# Patient Record
Sex: FEMALE | Race: WHITE | Employment: FULL TIME | URBAN - METROPOLITAN AREA
[De-identification: names, ages, dates, MRNs, and addresses within clinical notes are randomized per-mention and may not be internally consistent; named-entity substitution may affect disease eponyms.]

---

## 2018-08-09 ENCOUNTER — HOSPITAL ENCOUNTER (EMERGENCY)
Facility: CLINIC | Age: 38
Discharge: HOME | End: 2018-08-09
Attending: EMERGENCY MEDICINE | Admitting: EMERGENCY MEDICINE
Payer: COMMERCIAL

## 2018-08-09 VITALS
HEART RATE: 111 BPM | SYSTOLIC BLOOD PRESSURE: 158 MMHG | RESPIRATION RATE: 18 BRPM | OXYGEN SATURATION: 97 % | DIASTOLIC BLOOD PRESSURE: 97 MMHG | TEMPERATURE: 98.4 F | HEIGHT: 65 IN | WEIGHT: 140 LBS | BODY MASS INDEX: 23.32 KG/M2

## 2018-08-09 DIAGNOSIS — R04.0 EPISTAXIS: ICD-10-CM

## 2018-08-09 DIAGNOSIS — F41.0 PANIC ATTACK: ICD-10-CM

## 2018-08-09 PROCEDURE — 99283 EMERGENCY DEPT VISIT LOW MDM: CPT

## 2018-08-09 PROCEDURE — 25000132 ZZH RX MED GY IP 250 OP 250 PS 637: Performed by: EMERGENCY MEDICINE

## 2018-08-09 RX ORDER — LORAZEPAM 2 MG/ML
2 INJECTION INTRAMUSCULAR ONCE
Status: DISCONTINUED | OUTPATIENT
Start: 2018-08-09 | End: 2018-08-09 | Stop reason: HOSPADM

## 2018-08-09 RX ORDER — LORAZEPAM 1 MG/1
1 TABLET ORAL ONCE
Status: COMPLETED | OUTPATIENT
Start: 2018-08-09 | End: 2018-08-09

## 2018-08-09 RX ADMIN — LORAZEPAM 1 MG: 1 TABLET ORAL at 13:53

## 2018-08-09 ASSESSMENT — ENCOUNTER SYMPTOMS
NERVOUS/ANXIOUS: 1
TREMORS: 1

## 2018-08-09 NOTE — ED PROVIDER NOTES
"  History     Chief Complaint:  Panic attack and nose bleed    HPI   Amy L Boeckermann is a 37 year old female with a history of anxiety who presents to the emergency department with her dad for evaluation of a panic attack and nose bleed. The patient reports that this morning she woke up with a \"gushing\" nose bleed. She applied pressure and states it stopped for a little bit but then returned with more severe bleeding. She can not remember the last time she had a nose bleed and the blood triggered a panic attack. She has a history of panic attacks but notes she has never had one this bad or last this long. She now complains of spasms in her back from the constant tremors. She takes 20 mg of Ativan every morning and throughout the day as needed. She took one most recently at 1100 which did not help calm her down or stop her shaking which prompted her to come to the ED. The patient admits to drinking alcohol last night. She has not taken any aspirin or Advil lately and takes Tylenol PM occasionally to help her sleep. She denies bleeding anywhere else.     Allergies:  No Known Drug Allergies     Medications:    Acyclovir  Escitalopram oxalate  Ativan  Atarax    Past Medical History:    Anxiety  psoriasis    Past Surgical History:    History reviewed. No pertinent past surgical history.    Family History:    History reviewed. No pertinent family history.    Social History:  Former smoker  Positive for alcohol use.   Marital Status:        Review of Systems   HENT: Positive for nosebleeds.    Neurological: Positive for tremors.   Psychiatric/Behavioral: The patient is nervous/anxious.    All other systems reviewed and are negative.    Physical Exam   First Vitals:  BP: (!) 153/102  Pulse: 111  Temp: 98.4  F (36.9  C)  Resp: 24  Height: 165.1 cm (5' 5\")  Weight: 63.5 kg (140 lb)  SpO2: 96 %    Patient Vitals for the past 24 hrs:   BP Temp Temp src Pulse Resp SpO2 Height Weight   08/09/18 1246 (!) 153/102 98.4  F " "(36.9  C) Oral 111 24 96 % 1.651 m (5' 5\") 63.5 kg (140 lb)       Physical Exam    Constitutional: White female, sitting with nose clamp in place. Tremulous and anxious.   HENT: No signs of trauma. Oropharynx clear.   Eyes: EOM are normal. Pupils are equal, round, and reactive to light.   Neck: Normal range of motion. No JVD present. No cervical adenopathy.  Cardiovascular: Regular rhythm.  Exam reveals no gallop and no friction rub.    No murmur heard.  Pulmonary/Chest: Bilateral breath sounds normal. No wheezes, rhonchi or rales.  Abdominal: Soft. No tenderness. No rebound or guarding.   Musculoskeletal: No edema. No tenderness.   Lymphadenopathy: No lymphadenopathy.   Neurological: Alert and oriented to person, place, and time. Normal strength. Coordination normal.   Skin: Skin is warm and dry. No rash noted. No erythema.   Psych: Extremely anxious.    Emergency Department Course   Interventions:  1353 Ativan 1 mg PO    Emergency Department Course:  1309 Nursing notes and vitals reviewed. I performed an exam of the patient as documented above.     Medicine administered as documented above.     1416 I rechecked the patient and discussed the results of her workup thus far.     Findings and plan explained to the Patient. Patient discharged home with instructions regarding supportive care, medications, and reasons to return. The importance of close follow-up was reviewed.     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 This turned into a panic attack. She did take an Ativan which did help a little bit but she felt very shaky and came in. On exam, she felt quite tremulous and has a nose clamp on. Patient was given some oral Ativan after another half an hour and I was able to look into her nose, there was no active bleeding in either nares anteriorly. There was no blood posteriorly that I could see. She blew her nose and no clot came out, there was a " very slight pink tinge. There was no blood in the oropharynx. Patient was watched for an additional 20 minutes without any more bleeding. I decided that given her severe anxiety,  I would not place packing at this time, but did instruct her in controlling nose bleeds and if she could not get a recurrent nose bleed to stop, to return to the ED. I did refer her to Dr. Santos also if she has frequent nose bleeds    Diagnosis:    ICD-10-CM    1. Panic attack F41.0    2. Epistaxis R04.0        Disposition:  discharged to home    Scribe Disclosure:  I, Juno Prince, am serving as a scribe on 8/9/2018 at 1:09 PM to personally document services performed by Dr. Mayi MD based on my observations and the provider's statements to me.     Juno Prince  8/9/2018    EMERGENCY DEPARTMENT       Michael See MD  08/09/18 8256

## 2018-08-09 NOTE — DISCHARGE INSTRUCTIONS
Anxiety Reaction  Anxiety is the feeling we all get when we think something bad might happen. It is a normal response to stress and usually causes only a mild reaction. When anxiety becomes more severe, it can interfere with daily life. In some cases, you may not even be aware of what it is you re anxious about. There may also be a genetic link or it may be a learned behavior in the home.  Both psychological and physical triggers cause stress reaction. It's often a response to fear or emotional stress, real or imagined. This stress may come from home, family, work, or social relationships.  During an anxiety reaction, you may feel:    Helpless    Nervous    Depressed    Irritable  Your body may show signs of anxiety in many ways. You may experience:    Dry mouth    Shakiness    Dizziness    Weakness    Trouble breathing    Breathing fast (hyperventilating)    Chest pressure    Sweating    Headache    Nausea    Diarrhea    Tiredness    Inability to sleep    Sexual problems  Home care    Try to locate the sources of stress in your life. They may not be obvious. These may include:  ? Daily hassles of life (such as traffic jams, missed appointments, or car troubles)  ? Major life changes, both good (new baby or job promotion) and bad (loss of job or loss of loved one)  ? Overload: feeling that you have too many responsibilities and can't take care of all of them at once  ? Feeling helpless or feeling that your problems are beyond what you re able to solve    Notice how your body reacts to stress. Learn to listen to your body signals. This will help you take action before the stress becomes severe.    When you can, do something about the source of your stress. (Avoid hassles, limit the amount of change that happens in your life at one time and take a break when you feel overloaded).    Unfortunately, many stressful situations can't be avoided. It is necessary to learn how to better manage stress. There are many proven  methods that will reduce your anxiety. These include simple things like exercise, good nutrition, and adequate rest. Also, there are certain techniques that are helpful:  ? Relaxation  ? Breathing exercises  ? Visualization  ? Biofeedback  ? Meditation  For more information about this, consult your healthcare provider or go to a local bookstore and review the many books and tapes available on this subject.  Follow-up care  If you feel that your anxiety is not responding to self-help measures, contact your healthcare provider or make an appointment with a counselor. You may need short-term psychological counseling and temporary medicine to help you manage stress.  Call 911  Call 911 if any of these happen:    Trouble breathing    Confusion    Drowsiness or trouble wakening    Fainting or loss of consciousness    Rapid heart rate    Seizure    New chest pain that becomes more severe, lasts longer, or spreads into your shoulder, arm, neck, jaw, or back  When to seek medical advice  Call your healthcare provider right away if any of these happen:    Your symptoms get worse    Severe headache not relieved by rest and mild pain reliever  Date Last Reviewed: 10/1/2017    3438-[address]84 [employer] [address]. [address] [place] [address], [place] [address]. All rights reserved. This information is not intended as a substitute for professional medical care. Always follow your healthcare professional's instructions.

## 2018-08-09 NOTE — ED AVS SNAPSHOT
Emergency Department    [address] [facility] [address]    Phone:  [phone]    Fax:  [phone]                                       Amy L Boeckermann   MRN: [id number]    Department:   Emergency Department   Date of Visit:  8/9/2018           Patient Information     Date Of Birth          1980        Your diagnoses for this visit were:     Panic attack     Epistaxis        You were seen by Michael See MD.      Follow-up Information     Schedule an appointment as soon as possible for a visit with Mahogany Santos MD.    Specialty:  Otolaryngology    Why:  As needed;  recheck ed if bleeding reoccurs and can't stop    Contact information:    [facility]  [address] [address]  [phone]          Discharge Instructions         Anxiety Reaction  Anxiety is the feeling we all get when we think something bad might happen. It is a normal response to stress and usually causes only a mild reaction. When anxiety becomes more severe, it can interfere with daily life. In some cases, you may not even be aware of what it is you re anxious about. There may also be a genetic link or it may be a learned behavior in the home.  Both psychological and physical triggers cause stress reaction. It's often a response to fear or emotional stress, real or imagined. This stress may come from home, family, work, or social relationships.  During an anxiety reaction, you may feel:    Helpless    Nervous    Depressed    Irritable  Your body may show signs of anxiety in many ways. You may experience:    Dry mouth    Shakiness    Dizziness    Weakness    Trouble breathing    Breathing fast (hyperventilating)    Chest pressure    Sweating    Headache    Nausea    Diarrhea    Tiredness    Inability to sleep    Sexual problems  Home care    Try to locate the sources of stress in your life. They may not be obvious. These may include:  ? Daily hassles of life (such as  traffic jams, missed appointments, or car troubles)  ? Major life changes, both good (new baby or job promotion) and bad (loss of job or loss of loved one)  ? Overload: feeling that you have too many responsibilities and can't take care of all of them at once  ? Feeling helpless or feeling that your problems are beyond what you re able to solve    Notice how your body reacts to stress. Learn to listen to your body signals. This will help you take action before the stress becomes severe.    When you can, do something about the source of your stress. (Avoid hassles, limit the amount of change that happens in your life at one time and take a break when you feel overloaded).    Unfortunately, many stressful situations can't be avoided. It is necessary to learn how to better manage stress. There are many proven methods that will reduce your anxiety. These include simple things like exercise, good nutrition, and adequate rest. Also, there are certain techniques that are helpful:  ? Relaxation  ? Breathing exercises  ? Visualization  ? Biofeedback  ? Meditation  For more information about this, consult your healthcare provider or go to a local bookstore and review the many books and tapes available on this subject.  Follow-up care  If you feel that your anxiety is not responding to self-help measures, contact your healthcare provider or make an appointment with a counselor. You may need short-term psychological counseling and temporary medicine to help you manage stress.  Call 911  Call 911 if any of these happen:    Trouble breathing    Confusion    Drowsiness or trouble wakening    Fainting or loss of consciousness    Rapid heart rate    Seizure    New chest pain that becomes more severe, lasts longer, or spreads into your shoulder, arm, neck, jaw, or back  When to seek medical advice  Call your healthcare provider right away if any of these happen:    Your symptoms get worse    Severe headache not relieved by rest and  mild pain reliever  Date Last Reviewed: 10/1/2017    8558-3906 [employer]. [address] [place] [address], [place] [address]. All rights reserved. This information is not intended as a substitute for professional medical care. Always follow your healthcare professional's instructions.          Discharge References/Attachments     NOSEBLEED (ENGLISH)      24 Hour Appointment Hotline       To make an appointment at any [facility], call [phone] ([phone]). If you don't have a family doctor or clinic, we will help you find one. [facility] are conveniently located to serve the needs of you and your family.             Review of your medicines      Our records show that you are taking the medicines listed below. If these are incorrect, please call your family doctor or clinic.        Dose / Directions Last dose taken    ACYCLOVIR PO        Refills:  0        ATIVAN PO        Refills:  0        ESCITALOPRAM OXALATE PO        Refills:  0        hydrOXYzine 25 MG tablet   Commonly known as:  ATARAX   Dose:  25-50 mg   Quantity:  60 tablet        Take 1-2 tablets (25-50 mg) by mouth every 6 hours as needed for itching   Refills:  0                Orders Needing Specimen Collection     None      Pending Results     No orders found from 8/7/2018 to 8/10/2018.            Pending Culture Results     No orders found from 8/7/2018 to 8/10/2018.            Pending Results Instructions     If you had any lab results that were not finalized at the time of your Discharge, you can call the ED Lab Result RN at [phone]. You will be contacted by this team for any positive Lab results or changes in treatment. The nurses are available 7 days a week from 10A to 6:30P.  You can leave a message 24 hours per day and they will return your call.        Test Results From Your Hospital Stay               Clinical Quality Measure: Blood Pressure Screening     Your blood pressure was checked while you were in the  "emergency department today. The last reading we obtained was  BP: (!) 158/97 . Please read the guidelines below about what these numbers mean and what you should do about them.  If your systolic blood pressure (the top number) is less than 120 and your diastolic blood pressure (the bottom number) is less than 80, then your blood pressure is normal. There is nothing more that you need to do about it.  If your systolic blood pressure (the top number) is 120-139 or your diastolic blood pressure (the bottom number) is 80-89, your blood pressure may be higher than it should be. You should have your blood pressure rechecked within a year by a primary care provider.  If your systolic blood pressure (the top number) is 140 or greater or your diastolic blood pressure (the bottom number) is 90 or greater, you may have high blood pressure. High blood pressure is treatable, but if left untreated over time it can put you at risk for heart attack, stroke, or kidney failure. You should have your blood pressure rechecked by a primary care provider within the next 4 weeks.  If your provider in the emergency department today gave you specific instructions to follow-up with your doctor or provider even sooner than that, you should follow that instruction and not wait for up to 4 weeks for your follow-up visit.        Thank you for choosing [facility]       Thank you for choosing [facility] for your care. Our goal is always to provide you with excellent care. Hearing back from our patients is one way we can continue to improve our services. Please take a few minutes to complete the written survey that you may receive in the mail after you visit with us. Thank you!        [facility] Information     [employer] lets you send messages to your doctor, view your test results, renew your prescriptions, schedule appointments and more. To sign up, go to [email or web address] . Click on \"Log in\" on the left side of the screen, which will take you to " "the Welcome page. Then click on \"Sign up Now\" on the right side of the page.     You will be asked to enter the access code listed below, as well as some personal information. Please follow the directions to create your username and password.     Your access code is: [email or web address]  Expires: 2018  3:06 PM     Your access code will  in 90 days. If you need help or a new code, please call your [facility] clinic or [phone].        Care EveryWhere ID     This is your Care EveryWhere ID. This could be used by other organizations to access your [facility] medical records  [id number]        Equal Access to Services     WARREN GAMA : Barry Padron, adriana sheppard, ayse montero, rusty franco. So [place] [phone].    ATENCIÓN: Si habla español, tiene a zuniga disposición servicios gratuitos de asistencia lingüística. Llame al [phone].    We comply with applicable federal civil rights laws and [place] laws. We do not discriminate on the basis of race, color, national origin, age, disability, sex, sexual orientation, or gender identity.            After Visit Summary       This is your record. Keep this with you and show to your community pharmacist(s) and doctor(s) at your next visit.                  "

## 2018-08-09 NOTE — ED AVS SNAPSHOT
Emergency Department    [address] [place] [address]    Phone:  [phone]    Fax:  [phone]                                       Amy L Boeckermann   MRN: [id number]    Department:   Emergency Department   Date of Visit:  8/9/2018           After Visit Summary Signature Page     I have received my discharge instructions, and my questions have been answered. I have discussed any challenges I see with this plan with the nurse or doctor.    ..........................................................................................................................................  Patient/Patient Representative Signature      ..........................................................................................................................................  Patient Representative Print Name and Relationship to Patient    ..................................................               ................................................  Date                                            Time    ..........................................................................................................................................  Reviewed by Signature/Title    ...................................................              ..............................................  Date                                                            Time

## 2018-08-09 NOTE — ED NOTES
Patient extremely anxious and hyperventilating.  Emotional support and slow breathin exercises performed while writer was at bedside starting IV.  Pt vasoconstricted with the hyperventilation and writer unable to thread the IV. Attempt x2